# Patient Record
Sex: FEMALE | Race: WHITE | URBAN - METROPOLITAN AREA
[De-identification: names, ages, dates, MRNs, and addresses within clinical notes are randomized per-mention and may not be internally consistent; named-entity substitution may affect disease eponyms.]

---

## 2017-09-29 ENCOUNTER — OFFICE VISIT (OUTPATIENT)
Dept: URGENT CARE | Facility: URGENT CARE | Age: 52
End: 2017-09-29
Payer: COMMERCIAL

## 2017-09-29 VITALS
HEART RATE: 80 BPM | HEIGHT: 62 IN | SYSTOLIC BLOOD PRESSURE: 122 MMHG | TEMPERATURE: 98 F | RESPIRATION RATE: 16 BRPM | WEIGHT: 190 LBS | DIASTOLIC BLOOD PRESSURE: 66 MMHG | BODY MASS INDEX: 34.96 KG/M2

## 2017-09-29 DIAGNOSIS — L08.9 INFECTED SEBACEOUS CYST: Primary | ICD-10-CM

## 2017-09-29 DIAGNOSIS — L72.3 INFECTED SEBACEOUS CYST: Primary | ICD-10-CM

## 2017-09-29 PROCEDURE — 99213 OFFICE O/P EST LOW 20 MIN: CPT | Performed by: FAMILY MEDICINE

## 2017-09-29 RX ORDER — CEPHALEXIN 500 MG/1
500 CAPSULE ORAL 3 TIMES DAILY
Qty: 30 CAPSULE | Refills: 0 | Status: SHIPPED | OUTPATIENT
Start: 2017-09-29

## 2017-09-29 NOTE — NURSING NOTE
"Chief Complaint   Patient presents with     Urgent Care     Derm Problem     cyst on stomach that was a blackhead for about 1 year. Red and hot now.        Initial /66  Pulse 80  Temp 98  F (36.7  C) (Oral)  Resp 16  Ht 5' 2\" (1.575 m)  Wt 190 lb (86.2 kg)  BMI 34.75 kg/m2 Estimated body mass index is 34.75 kg/(m^2) as calculated from the following:    Height as of this encounter: 5' 2\" (1.575 m).    Weight as of this encounter: 190 lb (86.2 kg).  Medication Reconciliation: complete     "

## 2017-09-29 NOTE — PATIENT INSTRUCTIONS
Epidermoid Cyst (Sebaceous Cyst), Infected (Antibiotic Treatment)  You have an epidermoid cyst. This is a small, painless lump under your skin. An epidermoid cyst (often called a sebaceous cyst, epidermal cyst, or epidermal inclusion cyst) is a term most often used for 2 similar types of cysts:    Epidermoid cysts. These cysts form slowly under the skin. They can be found on most parts of the body. But they are most often found on areas with more hair such as the scalp, face, upper back, and genitals.    Pilar cysts. These are similar to epidermoid cysts. But they start from a different part of the hair follicle. They are more likely to be on the scalp.  Here are some general facts about these cysts:    A cyst is a sac filled with material that is often cheesy, fatty, oily, or stringy. The material inside can be thick. Or it can be a liquid.    You can usually move the cyst slightly if you try.    The cysts can be smaller than a pea or as large as a few inches.    The cysts are usually not painful, unless they become inflamed or infected.    The area around the cyst may smell bad. If the cyst breaks open, the material inside it often smells bad as well.  Your cyst became infected and your healthcare provider wanted to treat it with antibiotics. If the antibiotics don t clear up the infection, the cyst will need to be drained by making a small cut (incision). Local anesthesia will be used to numb the area.  Home care    Resist the temptation to squeeze or pop the cyst, stick a needle in it, or cut it open. This often leads to a worsening infection and scarring.    Take the antibiotic as directed until it is all used up.    Soak the affected area in hot water or apply a hot pack (a thin, clean towel soaked in hot water) for 20 minutes at a time. Do this 3 to 4 times a day.    Apply antibiotic cream or ointment 3 times a day.    You may use over-the-counter pain medicine to control pain, unless another medicine was  given. If you have chronic liver or kidney disease or ever had a stomach ulcer or GI bleeding, talk with your healthcare provider before using these medicines.  Prevention  Once this infection has healed, reduce the risk of future infections by:    Keeping the cyst area clean by bathing or showering daily    Avoiding tight-fitting clothing in the cyst area  Follow-up care  Follow up with your healthcare provider, or as advised. If a gauze packing was put in your wound, it should be removed in a few days as advised by your healthcare provider. Check your wound every day for the signs listed below.  When to seek medical advice  Call your healthcare provider right away if any of these occur:    Pus coming from the cyst    Increasing redness around the wound    Increasing local pain or swelling    Fever of 100.4 F (38 C) or higher, or as directed by your provider  Date Last Reviewed: 10/5/2016    4034-4934 [employer]. [address] [place] [address]. All rights reserved. This information is not intended as a substitute for professional medical care. Always follow your healthcare professional's instructions.

## 2017-09-29 NOTE — MR AVS SNAPSHOT
After Visit Summary   9/29/2017    Bessy Arciniega    MRN: [id number]           Patient Information     Date Of Birth          1965        Visit Information        Provider Department      9/29/2017 5:00 PM Rosana Spann MD [facility]        Today's Diagnoses     Infected sebaceous cyst    -  1      Care Instructions      Epidermoid Cyst (Sebaceous Cyst), Infected (Antibiotic Treatment)  You have an epidermoid cyst. This is a small, painless lump under your skin. An epidermoid cyst (often called a sebaceous cyst, epidermal cyst, or epidermal inclusion cyst) is a term most often used for 2 similar types of cysts:    Epidermoid cysts. These cysts form slowly under the skin. They can be found on most parts of the body. But they are most often found on areas with more hair such as the scalp, face, upper back, and genitals.    Pilar cysts. These are similar to epidermoid cysts. But they start from a different part of the hair follicle. They are more likely to be on the scalp.  Here are some general facts about these cysts:    A cyst is a sac filled with material that is often cheesy, fatty, oily, or stringy. The material inside can be thick. Or it can be a liquid.    You can usually move the cyst slightly if you try.    The cysts can be smaller than a pea or as large as a few inches.    The cysts are usually not painful, unless they become inflamed or infected.    The area around the cyst may smell bad. If the cyst breaks open, the material inside it often smells bad as well.  Your cyst became infected and your healthcare provider wanted to treat it with antibiotics. If the antibiotics don t clear up the infection, the cyst will need to be drained by making a small cut (incision). Local anesthesia will be used to numb the area.  Home care    Resist the temptation to squeeze or pop the cyst, stick a needle in it, or cut it open. This often leads to a worsening infection  and scarring.    Take the antibiotic as directed until it is all used up.    Soak the affected area in hot water or apply a hot pack (a thin, clean towel soaked in hot water) for 20 minutes at a time. Do this 3 to 4 times a day.    Apply antibiotic cream or ointment 3 times a day.    You may use over-the-counter pain medicine to control pain, unless another medicine was given. If you have chronic liver or kidney disease or ever had a stomach ulcer or GI bleeding, talk with your healthcare provider before using these medicines.  Prevention  Once this infection has healed, reduce the risk of future infections by:    Keeping the cyst area clean by bathing or showering daily    Avoiding tight-fitting clothing in the cyst area  Follow-up care  Follow up with your healthcare provider, or as advised. If a gauze packing was put in your wound, it should be removed in a few days as advised by your healthcare provider. Check your wound every day for the signs listed below.  When to seek medical advice  Call your healthcare provider right away if any of these occur:    Pus coming from the cyst    Increasing redness around the wound    Increasing local pain or swelling    Fever of 100.4 F (38 C) or higher, or as directed by your provider  Date Last Reviewed: 10/5/2016    1659-1253 [employer]. [address] [place] [address], [place] [address]. All rights reserved. This information is not intended as a substitute for professional medical care. Always follow your healthcare professional's instructions.                Follow-ups after your visit        Your next 10 appointments already scheduled     Oct 04, 2017  3:00 PM CDT   Office Visit with ARGELIA Ramey CNP   [facility] ([facility]    [address] [place] [id number] [phone]           Bring a current list of meds and any records pertaining to this visit. For Physicals, please bring immunization records  "and any forms needing to be filled out. Please arrive 10 minutes early to complete paperwork.              Who to contact     If you have questions or need follow up information about today's clinic visit or your schedule please contact [facility] directly at [phone].  Normal or non-critical lab and imaging results will be communicated to you by [employer] Local[employer], letter or phone within 4 business days after the clinic has received the results. If you do not hear from us within 7 days, please contact the clinic through Order Mapp[employer] or phone. If you have a critical or abnormal lab result, we will notify you by phone as soon as possible.  Submit refill requests through [employer] or call your pharmacy and they will forward the refill request to us. Please allow 3 business days for your refill to be completed.          Additional Information About Your Visit        At[employer] Information     [employer] gives you secure access to your electronic health record. If you see a primary care provider, you can also send messages to your care team and make appointments. If you have questions, please call your primary care clinic.  If you do not have a primary care provider, please call [phone] and they will assist you.        Care EveryWhere ID     This is your Care EveryWhere ID. This could be used by other organizations to access your [place] medical records  [id number]        Your Vitals Were     Pulse Temperature Respirations Height BMI (Body Mass Index)       80 98  F (36.7  C) (Oral) 16 5' 2\" (1.575 m) 34.75 kg/m2        Blood Pressure from Last 3 Encounters:   09/29/17 122/66   12/07/16 126/84   10/25/16 147/75    Weight from Last 3 Encounters:   09/29/17 190 lb (86.2 kg)   10/25/16 211 lb (95.7 kg)   10/19/16 211 lb 12.8 oz (96.1 kg)              Today, you had the following     No orders found for display         Today's Medication Changes          These changes are accurate as of: 9/29/17  5:16 PM.  If " you have any questions, ask your nurse or doctor.               Start taking these medicines.        Dose/Directions    cephALEXin 500 MG capsule   Commonly known as:  KEFLEX   Used for:  Infected sebaceous cyst   Started by:  Rosana Spann MD        Dose:  500 mg   Take 1 capsule (500 mg) by mouth 3 times daily   Quantity:  30 capsule   Refills:  0            Where to get your medicines      These medications were sent to [facility] - [place], [place] - [address]  [address], [place] [address]     Phone:  [phone]     cephALEXin 500 MG capsule                Primary Care Provider    None Specified       No primary provider on file.        Equal Access to Services     [facility]: Hadii triston lopes hadradhao Sokirsty, waaxda luqadaha, qaybta kaalmada adejeremíasyada, uri zhu . So [facility] [phone].    ATENCIÓN: Si habla español, tiene a warren disposición servicios gratuitos de asistencia lingüística. Llame al [phone].    We comply with applicable federal civil rights laws and [place] laws. We do not discriminate on the basis of race, color, national origin, age, disability, sex, sexual orientation, or gender identity.            Thank you!     Thank you for choosing [facility]  for your care. Our goal is always to provide you with excellent care. Hearing back from our patients is one way we can continue to improve our services. Please take a few minutes to complete the written survey that you may receive in the mail after your visit with us. Thank you!             Your Updated Medication List - Protect others around you: Learn how to safely use, store and throw away your medicines at www.disposemymeds.org.          This list is accurate as of: 9/29/17  5:16 PM.  Always use your most recent med list.                   Brand Name Dispense Instructions for use Diagnosis    cephALEXin 500 MG capsule    KEFLEX    30 capsule    Take  1 capsule (500 mg) by mouth 3 times daily    Infected sebaceous cyst       guaiFENesin 600 MG 12 hr tablet    MUCINEX     Take 1,200 mg by mouth 2 times daily        ibuprofen 600 MG tablet    ADVIL/MOTRIN    30 tablet    Take 1 tablet (600 mg) by mouth every 6 hours as needed for moderate pain    Pain in right elbow       LISINOPRIL PO           nitroGLYcerin 0.1 MG/HR 24 hr patch    NITRODUR    10 patch    Place 1 patch onto the skin daily for 10 days    Right lateral epicondylitis       traMADol 50 MG tablet    ULTRAM    7 tablet    Take 1 tablet (50 mg) by mouth nightly as needed for pain (for moderate/severe pain)    Lateral epicondylitis of right elbow

## 2017-09-29 NOTE — PROGRESS NOTES
"SUBJECTIVE:   Chief Complaint   Patient presents with     Urgent Care     Derm Problem     cyst on stomach that was a blackhead for about 1 year. Red and hot now.      Bessy Arciniega is a 51 year old female who presents for evaluation of and area of redness, tenderness, swelling and warmth of the skin that developed on the  right, lateral abdominal wall, below the ribs.  Patient has had pain, skin erythema (reddened skin) and tenderness for 3 days.    Precipitating event was she had a persistent pea sized lump (for 1 year),  She bumped it this past week and then it became worse,  .    Therapies tried: none.    Past Medical History:   Diagnosis Date     Hypertension      Shingles        ALLERGIES:  Review of patient's allergies indicates no known allergies.      Current Outpatient Prescriptions on File Prior to Visit:  LISINOPRIL PO    nitroglycerin (NITRODUR) 0.1 MG/HR Place 1 patch onto the skin daily for 10 days   traMADol (ULTRAM) 50 MG tablet Take 1 tablet (50 mg) by mouth nightly as needed for pain (for moderate/severe pain)   guaiFENesin (MUCINEX) 600 MG 12 hr tablet Take 1,200 mg by mouth 2 times daily   ibuprofen (ADVIL,MOTRIN) 600 MG tablet Take 1 tablet (600 mg) by mouth every 6 hours as needed for moderate pain     No current facility-administered medications on file prior to visit.     Social History   Substance Use Topics     Smoking status: Current Some Day Smoker     Smokeless tobacco: Never Used     Alcohol use Yes       No family history on file.      ROS:  CONSTITUTIONAL:NEGATIVE for fever, chills, change in weight  EYES: NEGATIVE for vision changes or irritation  ENT/MOUTH: NEGATIVE for ear, mouth and throat problems  RESP:NEGATIVE for significant cough or SOB  GI: NEGATIVE for nausea, abdominal pain, heartburn, or change in bowel habits    OBJECTIVE:  /66  Pulse 80  Temp 98  F (36.7  C) (Oral)  Resp 16  Ht 5' 2\" (1.575 m)  Wt 190 lb (86.2 kg)  BMI 34.75 kg/m2    Skin area involved " is 6 cm by 6 cm on the right,  lateral abdominal wall .   The skin appear erythematous, moderately swollen, with tenderness and warmth to the touch.  There is a firm tender 1 cm lump about 1.5 cm below the surface   GENERAL APPEARANCE: alert, mild distress and cooperative  ABDOMEN:  soft, nontender, no HSM or masses and bowel sounds normal  Extremities: no peripheral edema or tenderness,   MS:  extremities normal- no gross deformities noted, no erythema, FROM noted in all extremities  SKIN: no suspicious lesions or rashes    ASSESSMENT:  Infected sebaceous cyst     - cephALEXin (KEFLEX) 500 MG capsule; Take 1 capsule (500 mg) by mouth 3 times daily   10 days        We discussed that a SEBACEOUS CYST is a collection of skin oil ( sebum) that can not escape to the surface of the skin.  It is not dangerous or cancerous, but it may be in a location that is uncomfortable.  If the cyst is squeezed it has a tendency to become infected which could require that the cyst be incised and drained.  The sebaceous cyst will reform when the skin heals over.   The only way to eliminate a sebacous cyst  is to have a surgeon completely excise the sebacous cyst     Discussed the possibility that antibiotics may resolve the infection without the need of incision, but if she notices swelling and fluctuance she will need to return for incision and drainage    Go to Urgent care or Emergency Department if worsening fevers/chills and/or spreading infection.  Warm, moist packs or towels can be applied to the region to aid in resolution of the infection.  Use Tylenol or ibuprofen for pain or fever.       ......

## 2017-10-03 ENCOUNTER — MYC MEDICAL ADVICE (OUTPATIENT)
Dept: FAMILY MEDICINE | Facility: CLINIC | Age: 52
End: 2017-10-03

## 2017-10-03 NOTE — TELEPHONE ENCOUNTER
Patient needs a PCP visit to f/u on UC visit as derm issue is not improving.  May need referral and or recheck. Sent my chart message.  Celine Barfield RN

## 2017-10-04 ENCOUNTER — OFFICE VISIT (OUTPATIENT)
Dept: FAMILY MEDICINE | Facility: CLINIC | Age: 52
End: 2017-10-04
Payer: COMMERCIAL

## 2017-10-04 VITALS
SYSTOLIC BLOOD PRESSURE: 124 MMHG | TEMPERATURE: 97.6 F | OXYGEN SATURATION: 93 % | HEART RATE: 62 BPM | BODY MASS INDEX: 36.58 KG/M2 | DIASTOLIC BLOOD PRESSURE: 77 MMHG | RESPIRATION RATE: 18 BRPM | WEIGHT: 200 LBS

## 2017-10-04 DIAGNOSIS — R19.01 RUQ ABDOMINAL MASS: Primary | ICD-10-CM

## 2017-10-04 PROCEDURE — 99213 OFFICE O/P EST LOW 20 MIN: CPT | Performed by: NURSE PRACTITIONER

## 2017-10-04 NOTE — NURSING NOTE
"Chief Complaint   Patient presents with     Derm Problem     Health Maintenance       Initial /77 (BP Location: Left arm, Patient Position: Chair, Cuff Size: Adult Regular)  Pulse 62  Temp 97.6  F (36.4  C) (Tympanic)  Resp 18  Wt 200 lb (90.7 kg)  SpO2 93%  BMI 36.58 kg/m2 Estimated body mass index is 36.58 kg/(m^2) as calculated from the following:    Height as of 9/29/17: 5' 2\" (1.575 m).    Weight as of this encounter: 200 lb (90.7 kg).  Medication Reconciliation: complete     Noa Rosales MA      "

## 2017-10-04 NOTE — MR AVS SNAPSHOT
After Visit Summary   10/4/2017    Bessy Arciniega    MRN: [id number]           Patient Information     Date Of Birth          1965        Visit Information        Provider Department      10/4/2017 3:00 PM Jacque Hooper APRN CNP [facility]        Today's Diagnoses     RUQ abdominal mass    -  1       Follow-ups after your visit        Who to contact     If you have questions or need follow up information about today's clinic visit or your schedule please contact [facility] directly at [phone].  Normal or non-critical lab and imaging results will be communicated to you by [employer] Compositeshart, letter or phone within 4 business days after the clinic has received the results. If you do not hear from us within 7 days, please contact the clinic through Amindt or phone. If you have a critical or abnormal lab result, we will notify you by phone as soon as possible.  Submit refill requests through [employer] or call your pharmacy and they will forward the refill request to us. Please allow 3 business days for your refill to be completed.          Additional Information About Your Visit        MyChart Information     [employer] gives you secure access to your electronic health record. If you see a primary care provider, you can also send messages to your care team and make appointments. If you have questions, please call your primary care clinic.  If you do not have a primary care provider, please call [phone] and they will assist you.        Care EveryWhere ID     This is your Care EveryWhere ID. This could be used by other organizations to access your [place] medical records  [id number]        Your Vitals Were     Pulse Temperature Respirations Pulse Oximetry BMI (Body Mass Index)       62 97.6  F (36.4  C) (Tympanic) 18 93% 36.58 kg/m2        Blood Pressure from Last 3 Encounters:   10/08/17 116/73   10/06/17 154/86   10/04/17 124/77    Weight from Last 3  Encounters:   10/08/17 201 lb (91.2 kg)   10/04/17 200 lb (90.7 kg)   09/29/17 190 lb (86.2 kg)               Primary Care Provider    None Specified       No primary provider on file.        Equal Access to Services     DIANA REID : Hadii aad ku hadwin Mckinley, wamelissada luqadaha, qaybta kaalmada adeperla, uri noelin hayaakizzy ashleyjeremías villar marko barr. So [facility] [phone].    ATENCIÓN: Si habla español, tiene a warren disposición servicios gratuitos de asistencia lingüística. Llame al [phone].    We comply with applicable federal civil rights laws and [place] laws. We do not discriminate on the basis of race, color, national origin, age, disability, sex, sexual orientation, or gender identity.            Thank you!     Thank you for choosing [facility]  for your care. Our goal is always to provide you with excellent care. Hearing back from our patients is one way we can continue to improve our services. Please take a few minutes to complete the written survey that you may receive in the mail after your visit with us. Thank you!             Your Updated Medication List - Protect others around you: Learn how to safely use, store and throw away your medicines at www.disposemymeds.org.          This list is accurate as of: 10/4/17 11:59 PM.  Always use your most recent med list.                   Brand Name Dispense Instructions for use Diagnosis    cephALEXin 500 MG capsule    KEFLEX    30 capsule    Take 1 capsule (500 mg) by mouth 3 times daily    Infected sebaceous cyst       ibuprofen 600 MG tablet    ADVIL/MOTRIN    30 tablet    Take 1 tablet (600 mg) by mouth every 6 hours as needed for moderate pain    Pain in right elbow       LISINOPRIL PO

## 2017-10-04 NOTE — PROGRESS NOTES
SUBJECTIVE:   Bessy Arciniega is a 51 year old female who presents to clinic today for the following health issues:    Derm Problem      Duration: x 1 year    Description  Location: right side cyst- is pretty painful, sometimes itchy  Itching: no    Intensity:  moderate    Accompanying signs and symptoms: None    History (similar episodes/previous evaluation): None    Precipitating or alleviating factors:  New exposures:  None  Recent travel: no      Therapies tried and outcome: Keflex 500MG capsule - seems to be getting better with the keflex but not gone yet.  No more redness and less pain.  No fevers.  Appetite OK.        Problem list and histories reviewed & adjusted, as indicated.  Additional history: as documented    Reviewed and updated as needed this visit by clinical staffTobacco  Allergies  Meds  Problems  Med Hx  Surg Hx  Fam Hx  Soc Hx        Reviewed and updated as needed this visit by Provider  Tobacco  Allergies  Meds  Problems  Med Hx  Surg Hx  Fam Hx  Soc Hx          ROS:  C: NEGATIVE for fever, chills, change in weight  INTEGUMENTARY/SKIN:   E/M: NEGATIVE for ear, mouth and throat problems  R: NEGATIVE for significant cough or SOB  CV: NEGATIVE for chest pain, palpitations or peripheral edema    OBJECTIVE:     /77 (BP Location: Left arm, Patient Position: Chair, Cuff Size: Adult Regular)  Pulse 62  Temp 97.6  F (36.4  C) (Tympanic)  Resp 18  Wt 200 lb (90.7 kg)  SpO2 93%  BMI 36.58 kg/m2  Body mass index is 36.58 kg/(m^2).  GENERAL: healthy, alert and no distress  RESP: lungs clear to auscultation - no rales, rhonchi or wheezes  CV: regular rate and rhythm, normal S1 S2, no S3 or S4, no murmur, click or rub, no peripheral edema and peripheral pulses strong  ABDOMEN: soft, nontender, without hepatosplenomegaly or masses, bowel sounds normal and mass soft 1 cm mass on right lateral abdominal wall near lower ribs  MS: no gross musculoskeletal defects noted, no  edema      ASSESSMENT/PLAN:         ICD-10-CM    1. RUQ abdominal mass R19.01 US Abdomen Limited   on keflex, improving but not resolved.  Will get abd US to determine contents of cyst.  Continue keflex.  Plan pending results.      See Patient Instructions    ARGELIA Ramey CNP  [facility]

## 2017-10-05 ENCOUNTER — TELEPHONE (OUTPATIENT)
Dept: FAMILY MEDICINE | Facility: CLINIC | Age: 52
End: 2017-10-05

## 2017-10-05 NOTE — TELEPHONE ENCOUNTER
Patient called in through [facility] asking for her ultrasound results, limited access in epic, recommended she call clinic in the morning, will route message to care team as well. Please call patient tomorrow with results from ultrasound and next steps.    Raquel Sanchez RN, BSN  [facility]

## 2017-10-06 ENCOUNTER — TELEPHONE (OUTPATIENT)
Dept: FAMILY MEDICINE | Facility: CLINIC | Age: 52
End: 2017-10-06

## 2017-10-06 ENCOUNTER — OFFICE VISIT (OUTPATIENT)
Dept: FAMILY MEDICINE | Facility: CLINIC | Age: 52
End: 2017-10-06
Payer: COMMERCIAL

## 2017-10-06 VITALS
TEMPERATURE: 97 F | SYSTOLIC BLOOD PRESSURE: 154 MMHG | HEART RATE: 65 BPM | OXYGEN SATURATION: 98 % | DIASTOLIC BLOOD PRESSURE: 86 MMHG

## 2017-10-06 DIAGNOSIS — L72.0 INFECTED EPITHELIAL INCLUSION CYST: Primary | ICD-10-CM

## 2017-10-06 DIAGNOSIS — L08.9 INFECTED EPITHELIAL INCLUSION CYST: Primary | ICD-10-CM

## 2017-10-06 PROCEDURE — 10060 I&D ABSCESS SIMPLE/SINGLE: CPT | Performed by: FAMILY MEDICINE

## 2017-10-06 PROCEDURE — 87070 CULTURE OTHR SPECIMN AEROBIC: CPT | Performed by: FAMILY MEDICINE

## 2017-10-06 NOTE — NURSING NOTE
"Chief Complaint   Patient presents with     Derm Problem       Initial /86  Pulse 65  Temp 97  F (36.1  C)  SpO2 98% Estimated body mass index is 36.58 kg/(m^2) as calculated from the following:    Height as of 9/29/17: 5' 2\" (1.575 m).    Weight as of 10/4/17: 200 lb (90.7 kg).  Medication Reconciliation: complete     Noa Rosales MA      "

## 2017-10-06 NOTE — TELEPHONE ENCOUNTER
Discussed with patient and Dr. Loaiza. Pt coming in at 1:15 for possible I & D of her abdominal cyst abscess.  Celine Barfield RN

## 2017-10-06 NOTE — TELEPHONE ENCOUNTER
There looks to be a small skin abscess possibly related to a cyst.     If really inflamed, we sometimes do an I and D on these. I could possibly add her in to look at this and do it if it looks to be beneficial.     Alexander Loaiza

## 2017-10-06 NOTE — MR AVS SNAPSHOT
After Visit Summary   10/6/2017    Bessy Arciniega    MRN: [id number]           Patient Information     Date Of Birth          1965        Visit Information        Provider Department      10/6/2017 1:20 PM Alexander Loaiza MD [facility]        Today's Diagnoses     Infected epithelial inclusion cyst    -  1       Follow-ups after your visit        Additional Services     DERMATOLOGY REFERRAL       Your provider has referred you to: FMG: [facility] [phone]   [email or web address]/  FMG: [facility] [phone]   [email or web address]/[place]/  FMG: [facility] [phone]  [facility]: [facility] [place] [phone]   http://www.[facility]/  [facility] [phone]   [email or web address]  FHN: [facility] [phone]   [email or web address]://[email or web address]/  FHN: [facility] [place] [facility] [phone]  [email or web address].[email or web address]  N: [facility] [place] [phone]   [email or web address]  FHN: [facility] [phone]   [email or web address]  N: [facility] [place] [phone]   [email or web address]    Please be aware that coverage of these services is subject to the terms and limitations of your health insurance plan.  Call member services at your health plan with any benefit or coverage questions.      Please bring the following with you to your appointment:    (1) Any X-Rays, CTs or MRIs which have been performed.  Contact the facility where they were done to arrange for  prior to your scheduled appointment.    (2) List of current medications  (3) This referral request   (4) Any documents/labs given to you  for this referral                  Who to contact     If you have questions or need follow up information about today's clinic visit or your schedule please contact [facility] directly at [phone].  Normal or non-critical lab and imaging results will be communicated to you by MyChart, letter or phone within 4 business days after the clinic has received the results. If you do not hear from us within 7 days, please contact the clinic through MyChart or phone. If you have a critical or abnormal lab result, we will notify you by phone as soon as possible.  Submit refill requests through [employer] or call your pharmacy and they will forward the refill request to us. Please allow 3 business days for your refill to be completed.          Additional Information About Your Visit        [employer] Information     Out[employer] gives you secure access to your electronic health record. If you see a primary care provider, you can also send messages to your care team and make appointments. If you have questions, please call your primary care clinic.  If you do not have a primary care provider, please call [phone] and they will assist you.        Care EveryWhere ID     This is your Care EveryWhere ID. This could be used by other organizations to access your [place] medical records  [id number]        Your Vitals Were     Pulse Temperature Pulse Oximetry             65 97  F (36.1  C) 98%          Blood Pressure from Last 3 Encounters:   10/06/17 154/86   10/04/17 124/77   09/29/17 122/66    Weight from Last 3 Encounters:   10/04/17 200 lb (90.7 kg)   09/29/17 190 lb (86.2 kg)   10/25/16 211 lb (95.7 kg)              We Performed the Following     DERMATOLOGY REFERRAL     Wound Culture Aerobic Bacterial        Primary Care Provider    None Specified       No primary provider on file.        Equal Access to Services     DIANA REID : ly Fabian qaybta kaalmada adeegyada,  uri ashleyjeremías castillo'aan ah. So [facility] [phone].    ATENCIÓN: Si habla rena, tiene a warren disposición servicios gratuitos de asistencia lingüística. Ankush patterson [phone].    We comply with applicable federal civil rights laws and [place] laws. We do not discriminate on the basis of race, color, national origin, age, disability, sex, sexual orientation, or gender identity.            Thank you!     Thank you for choosing [facility]  for your care. Our goal is always to provide you with excellent care. Hearing back from our patients is one way we can continue to improve our services. Please take a few minutes to complete the written survey that you may receive in the mail after your visit with us. Thank you!             Your Updated Medication List - Protect others around you: Learn how to safely use, store and throw away your medicines at www.disposemymeds.org.          This list is accurate as of: 10/6/17  2:08 PM.  Always use your most recent med list.                   Brand Name Dispense Instructions for use Diagnosis    cephALEXin 500 MG capsule    KEFLEX    30 capsule    Take 1 capsule (500 mg) by mouth 3 times daily    Infected sebaceous cyst       ibuprofen 600 MG tablet    ADVIL/MOTRIN    30 tablet    Take 1 tablet (600 mg) by mouth every 6 hours as needed for moderate pain    Pain in right elbow       LISINOPRIL PO

## 2017-10-08 ENCOUNTER — OFFICE VISIT (OUTPATIENT)
Dept: URGENT CARE | Facility: URGENT CARE | Age: 52
End: 2017-10-08
Payer: COMMERCIAL

## 2017-10-08 VITALS
TEMPERATURE: 99 F | BODY MASS INDEX: 36.99 KG/M2 | DIASTOLIC BLOOD PRESSURE: 73 MMHG | OXYGEN SATURATION: 96 % | WEIGHT: 201 LBS | HEART RATE: 63 BPM | HEIGHT: 62 IN | SYSTOLIC BLOOD PRESSURE: 116 MMHG

## 2017-10-08 DIAGNOSIS — Z51.89 WOUND CHECK, ABSCESS: Primary | ICD-10-CM

## 2017-10-08 DIAGNOSIS — Z98.890 STATUS POST INCISION AND DRAINAGE: ICD-10-CM

## 2017-10-08 LAB
BACTERIA SPEC CULT: NO GROWTH
Lab: NORMAL
SPECIMEN SOURCE: NORMAL

## 2017-10-08 PROCEDURE — 99207 ZZC PREOP VISIT IN GLOBAL PKG: CPT | Performed by: PHYSICIAN ASSISTANT

## 2017-10-08 NOTE — PROGRESS NOTES
Wound Check S/P I & D:     Date sutures applied: 10/06/17           Where (setting) in which they applied:Urgent Care visit with Dr. Loaiza See office note for specifics    Description:  Type: I & D was packed with gauze  Location: right lateral abdominal wall    History:    Cause of laceration: I & D packed with gauze looking for reevaluation of wound and repacking if necessary    Accompanying Signs & Symptoms: (staff: if yes-describe)  Redness: no  Warmth: no  Drainage: no  Still bleeding: no  Fevers: no    Last tetanus shot: Up to date by patient history.     Wound is non reactive  Wound is healing well and improving . No dressing or packing was on the wound when patient presented to clinic.  Wound care gone over.  Follow up as instructed by Primary care provider.  Indication for return gone over and all questions answered before discharge.

## 2017-10-08 NOTE — MR AVS SNAPSHOT
"              After Visit Summary   10/8/2017    Bessy Arciniega    MRN: [id number]           Patient Information     Date Of Birth          1965        Visit Information        Provider Department      10/8/2017 12:10 PM Yves Hodge PA-C [facility]        Today's Diagnoses     Wound check, abscess    -  1    Status post incision and drainage           Follow-ups after your visit        Who to contact     If you have questions or need follow up information about today's clinic visit or your schedule please contact [facility] directly at [phone].  Normal or non-critical lab and imaging results will be communicated to you by RetAPPshart, letter or phone within 4 business days after the clinic has received the results. If you do not hear from us within 7 days, please contact the clinic through [employer] or phone. If you have a critical or abnormal lab result, we will notify you by phone as soon as possible.  Submit refill requests through Cl[employer]s! or call your pharmacy and they will forward the refill request to us. Please allow 3 business days for your refill to be completed.          Additional Information About Your Visit        MyChart Information     Cl[employer]s! gives you secure access to your electronic health record. If you see a primary care provider, you can also send messages to your care team and make appointments. If you have questions, please call your primary care clinic.  If you do not have a primary care provider, please call [phone] and they will assist you.        Care EveryWhere ID     This is your Care EveryWhere ID. This could be used by other organizations to access your [place] medical records  [id number]        Your Vitals Were     Pulse Temperature Height Pulse Oximetry BMI (Body Mass Index)       63 99  F (37.2  C) (Tympanic) 5' 2\" (1.575 m) 96% 36.76 kg/m2        Blood Pressure from Last 3 Encounters:   10/08/17 116/73   10/06/17 " 154/86   10/04/17 124/77    Weight from Last 3 Encounters:   10/08/17 201 lb (91.2 kg)   10/04/17 200 lb (90.7 kg)   09/29/17 190 lb (86.2 kg)              Today, you had the following     No orders found for display       Primary Care Provider    None Specified       No primary provider on file.        Equal Access to Services     [facility] Center[facility] : Hadii aad ku hadasho Sokartikali, waaxda luqadaha, qaybta kaalmada lindayada, uri azarjacqueluz marina zhu . So [facility] [phone].    ATENCIÓN: Si habla español, tiene a warren disposición servicios gratuitos de asistencia lingüística. Llame al [phone].    We comply with applicable federal civil rights laws and [place] laws. We do not discriminate on the basis of race, color, national origin, age, disability, sex, sexual orientation, or gender identity.            Thank you!     Thank you for choosing [facility]  for your care. Our goal is always to provide you with excellent care. Hearing back from our patients is one way we can continue to improve our services. Please take a few minutes to complete the written survey that you may receive in the mail after your visit with us. Thank you!             Your Updated Medication List - Protect others around you: Learn how to safely use, store and throw away your medicines at www.disposemymeds.org.          This list is accurate as of: 10/8/17  2:39 PM.  Always use your most recent med list.                   Brand Name Dispense Instructions for use Diagnosis    cephALEXin 500 MG capsule    KEFLEX    30 capsule    Take 1 capsule (500 mg) by mouth 3 times daily    Infected sebaceous cyst       ibuprofen 600 MG tablet    ADVIL/MOTRIN    30 tablet    Take 1 tablet (600 mg) by mouth every 6 hours as needed for moderate pain    Pain in right elbow       LISINOPRIL PO

## 2017-10-08 NOTE — NURSING NOTE
"Chief Complaint   Patient presents with     Urgent Care     Pt in clinic to have wound check.     Wound Check       Initial /73  Pulse 63  Temp 99  F (37.2  C) (Tympanic)  Ht 5' 2\" (1.575 m)  Wt 201 lb (91.2 kg)  SpO2 96%  BMI 36.76 kg/m2 Estimated body mass index is 36.76 kg/(m^2) as calculated from the following:    Height as of this encounter: 5' 2\" (1.575 m).    Weight as of this encounter: 201 lb (91.2 kg).  Medication Reconciliation: complete   Jodie Bullock/ MA    "

## 2018-05-31 ENCOUNTER — TELEPHONE (OUTPATIENT)
Dept: FAMILY MEDICINE | Facility: CLINIC | Age: 53
End: 2018-05-31

## 2018-06-05 ENCOUNTER — HEALTH MAINTENANCE LETTER (OUTPATIENT)
Age: 53
End: 2018-06-05

## 2018-06-19 ENCOUNTER — TELEPHONE (OUTPATIENT)
Dept: FAMILY MEDICINE | Facility: CLINIC | Age: 53
End: 2018-06-19

## 2018-07-09 ENCOUNTER — TELEPHONE (OUTPATIENT)
Dept: FAMILY MEDICINE | Facility: CLINIC | Age: 53
End: 2018-07-09

## 2020-03-10 ENCOUNTER — HEALTH MAINTENANCE LETTER (OUTPATIENT)
Age: 55
End: 2020-03-10

## 2020-12-27 ENCOUNTER — HEALTH MAINTENANCE LETTER (OUTPATIENT)
Age: 55
End: 2020-12-27

## 2021-04-24 ENCOUNTER — HEALTH MAINTENANCE LETTER (OUTPATIENT)
Age: 56
End: 2021-04-24

## 2021-05-26 ENCOUNTER — RECORDS - HEALTHEAST (OUTPATIENT)
Dept: ADMINISTRATIVE | Facility: CLINIC | Age: 56
End: 2021-05-26

## 2021-10-04 ENCOUNTER — HEALTH MAINTENANCE LETTER (OUTPATIENT)
Age: 56
End: 2021-10-04

## 2022-03-20 ENCOUNTER — HEALTH MAINTENANCE LETTER (OUTPATIENT)
Age: 57
End: 2022-03-20

## 2022-05-15 ENCOUNTER — HEALTH MAINTENANCE LETTER (OUTPATIENT)
Age: 57
End: 2022-05-15

## 2022-09-11 ENCOUNTER — HEALTH MAINTENANCE LETTER (OUTPATIENT)
Age: 57
End: 2022-09-11

## 2022-12-05 ENCOUNTER — LAB REQUISITION (OUTPATIENT)
Dept: LAB | Facility: CLINIC | Age: 57
End: 2022-12-05
Payer: COMMERCIAL

## 2022-12-05 DIAGNOSIS — I10 ESSENTIAL (PRIMARY) HYPERTENSION: ICD-10-CM

## 2022-12-05 LAB
ANION GAP SERPL CALCULATED.3IONS-SCNC: 17 MMOL/L (ref 7–15)
BUN SERPL-MCNC: 10.4 MG/DL (ref 6–20)
CALCIUM SERPL-MCNC: 9.3 MG/DL (ref 8.6–10)
CHLORIDE SERPL-SCNC: 98 MMOL/L (ref 98–107)
CHOLEST SERPL-MCNC: 165 MG/DL
CREAT SERPL-MCNC: 0.67 MG/DL (ref 0.51–0.95)
DEPRECATED HCO3 PLAS-SCNC: 23 MMOL/L (ref 22–29)
GFR SERPL CREATININE-BSD FRML MDRD: >90 ML/MIN/1.73M2
GLUCOSE SERPL-MCNC: 102 MG/DL (ref 70–99)
HDLC SERPL-MCNC: 36 MG/DL
LDLC SERPL CALC-MCNC: 81 MG/DL
NONHDLC SERPL-MCNC: 129 MG/DL
POTASSIUM SERPL-SCNC: 3.7 MMOL/L (ref 3.4–5.3)
SODIUM SERPL-SCNC: 138 MMOL/L (ref 136–145)
TRIGL SERPL-MCNC: 239 MG/DL

## 2022-12-05 PROCEDURE — 80061 LIPID PANEL: CPT | Mod: ORL | Performed by: STUDENT IN AN ORGANIZED HEALTH CARE EDUCATION/TRAINING PROGRAM

## 2022-12-05 PROCEDURE — 80048 BASIC METABOLIC PNL TOTAL CA: CPT | Mod: ORL | Performed by: STUDENT IN AN ORGANIZED HEALTH CARE EDUCATION/TRAINING PROGRAM

## 2023-01-22 ENCOUNTER — HEALTH MAINTENANCE LETTER (OUTPATIENT)
Age: 58
End: 2023-01-22

## 2024-02-24 ENCOUNTER — HEALTH MAINTENANCE LETTER (OUTPATIENT)
Age: 59
End: 2024-02-24

## 2024-04-24 ENCOUNTER — LAB REQUISITION (OUTPATIENT)
Dept: LAB | Facility: CLINIC | Age: 59
End: 2024-04-24
Payer: COMMERCIAL

## 2024-04-24 DIAGNOSIS — I10 ESSENTIAL (PRIMARY) HYPERTENSION: ICD-10-CM

## 2024-04-24 PROCEDURE — 80061 LIPID PANEL: CPT | Mod: ORL | Performed by: STUDENT IN AN ORGANIZED HEALTH CARE EDUCATION/TRAINING PROGRAM

## 2024-04-24 PROCEDURE — 80048 BASIC METABOLIC PNL TOTAL CA: CPT | Mod: ORL | Performed by: STUDENT IN AN ORGANIZED HEALTH CARE EDUCATION/TRAINING PROGRAM

## 2024-04-25 LAB
ANION GAP SERPL CALCULATED.3IONS-SCNC: 12 MMOL/L (ref 7–15)
BUN SERPL-MCNC: 13.9 MG/DL (ref 6–20)
CALCIUM SERPL-MCNC: 9.4 MG/DL (ref 8.6–10)
CHLORIDE SERPL-SCNC: 101 MMOL/L (ref 98–107)
CHOLEST SERPL-MCNC: 174 MG/DL
CREAT SERPL-MCNC: 0.69 MG/DL (ref 0.51–0.95)
DEPRECATED HCO3 PLAS-SCNC: 27 MMOL/L (ref 22–29)
EGFRCR SERPLBLD CKD-EPI 2021: >90 ML/MIN/1.73M2
FASTING STATUS PATIENT QL REPORTED: ABNORMAL
GLUCOSE SERPL-MCNC: 115 MG/DL (ref 70–99)
HDLC SERPL-MCNC: 38 MG/DL
LDLC SERPL CALC-MCNC: 77 MG/DL
NONHDLC SERPL-MCNC: 136 MG/DL
POTASSIUM SERPL-SCNC: 3.8 MMOL/L (ref 3.4–5.3)
SODIUM SERPL-SCNC: 140 MMOL/L (ref 135–145)
TRIGL SERPL-MCNC: 293 MG/DL

## 2024-05-04 ENCOUNTER — HEALTH MAINTENANCE LETTER (OUTPATIENT)
Age: 59
End: 2024-05-04

## 2024-11-22 PROCEDURE — G0145 SCR C/V CYTO,THINLAYER,RESCR: HCPCS | Mod: ORL | Performed by: STUDENT IN AN ORGANIZED HEALTH CARE EDUCATION/TRAINING PROGRAM

## 2024-11-22 PROCEDURE — 87624 HPV HI-RISK TYP POOLED RSLT: CPT | Mod: ORL | Performed by: STUDENT IN AN ORGANIZED HEALTH CARE EDUCATION/TRAINING PROGRAM

## 2024-11-26 ENCOUNTER — LAB REQUISITION (OUTPATIENT)
Dept: LAB | Facility: CLINIC | Age: 59
End: 2024-11-26
Payer: COMMERCIAL

## 2024-11-26 DIAGNOSIS — Z00.00 ENCOUNTER FOR GENERAL ADULT MEDICAL EXAMINATION WITHOUT ABNORMAL FINDINGS: ICD-10-CM

## 2024-11-29 LAB
HPV HR 12 DNA CVX QL NAA+PROBE: NEGATIVE
HPV16 DNA CVX QL NAA+PROBE: NEGATIVE
HPV18 DNA CVX QL NAA+PROBE: NEGATIVE
HUMAN PAPILLOMA VIRUS FINAL DIAGNOSIS: NORMAL

## 2024-12-03 LAB
BKR AP ASSOCIATED HPV REPORT: NORMAL
BKR LAB AP GYN ADEQUACY: NORMAL
BKR LAB AP GYN INTERPRETATION: NORMAL
BKR LAB AP LMP: NORMAL
BKR LAB AP PREVIOUS ABNL DX: NORMAL
BKR LAB AP PREVIOUS ABNORMAL: NORMAL
PATH REPORT.COMMENTS IMP SPEC: NORMAL
PATH REPORT.COMMENTS IMP SPEC: NORMAL
PATH REPORT.RELEVANT HX SPEC: NORMAL

## 2025-03-09 ENCOUNTER — HEALTH MAINTENANCE LETTER (OUTPATIENT)
Age: 60
End: 2025-03-09